# Patient Record
Sex: FEMALE | Race: WHITE | NOT HISPANIC OR LATINO | Employment: UNEMPLOYED | URBAN - METROPOLITAN AREA
[De-identification: names, ages, dates, MRNs, and addresses within clinical notes are randomized per-mention and may not be internally consistent; named-entity substitution may affect disease eponyms.]

---

## 2025-05-01 ENCOUNTER — HOSPITAL ENCOUNTER (EMERGENCY)
Dept: CARDIOLOGY | Facility: HOSPITAL | Age: 50
Discharge: HOME | End: 2025-05-01

## 2025-05-01 ENCOUNTER — HOSPITAL ENCOUNTER (EMERGENCY)
Facility: HOSPITAL | Age: 50
Discharge: HOME | End: 2025-05-02
Attending: EMERGENCY MEDICINE

## 2025-05-01 ENCOUNTER — APPOINTMENT (OUTPATIENT)
Dept: CARDIOLOGY | Facility: HOSPITAL | Age: 50
End: 2025-05-01

## 2025-05-01 DIAGNOSIS — F29 PSYCHOSIS, UNSPECIFIED PSYCHOSIS TYPE (MULTI): Primary | ICD-10-CM

## 2025-05-01 LAB
ALBUMIN SERPL BCP-MCNC: 4.2 G/DL (ref 3.4–5)
ALP SERPL-CCNC: 72 U/L (ref 33–110)
ALT SERPL W P-5'-P-CCNC: 11 U/L (ref 7–45)
AMORPH CRY #/AREA UR COMP ASSIST: NORMAL /HPF
AMPHETAMINES UR QL SCN: NORMAL
ANION GAP SERPL CALC-SCNC: 9 MMOL/L (ref 10–20)
APAP SERPL-MCNC: <10 UG/ML (ref ?–30)
APPEARANCE UR: CLEAR
AST SERPL W P-5'-P-CCNC: 15 U/L (ref 9–39)
B-HCG SERPL-ACNC: 2 MIU/ML
BARBITURATES UR QL SCN: NORMAL
BASOPHILS # BLD AUTO: 0.01 X10*3/UL (ref 0–0.1)
BASOPHILS NFR BLD AUTO: 0.3 %
BENZODIAZ UR QL SCN: NORMAL
BILIRUB SERPL-MCNC: 0.7 MG/DL (ref 0–1.2)
BILIRUB UR STRIP.AUTO-MCNC: NEGATIVE MG/DL
BUN SERPL-MCNC: 17 MG/DL (ref 6–23)
BZE UR QL SCN: NORMAL
CALCIUM SERPL-MCNC: 9.4 MG/DL (ref 8.6–10.3)
CANNABINOIDS UR QL SCN: NORMAL
CHLORIDE SERPL-SCNC: 103 MMOL/L (ref 98–107)
CK SERPL-CCNC: 108 U/L (ref 0–215)
CO2 SERPL-SCNC: 30 MMOL/L (ref 21–32)
COLOR UR: COLORLESS
CREAT SERPL-MCNC: 0.74 MG/DL (ref 0.5–1.05)
EGFRCR SERPLBLD CKD-EPI 2021: >90 ML/MIN/1.73M*2
EOSINOPHIL # BLD AUTO: 0.03 X10*3/UL (ref 0–0.7)
EOSINOPHIL NFR BLD AUTO: 0.8 %
ERYTHROCYTE [DISTWIDTH] IN BLOOD BY AUTOMATED COUNT: 12.9 % (ref 11.5–14.5)
ETHANOL SERPL-MCNC: <10 MG/DL
FENTANYL+NORFENTANYL UR QL SCN: NORMAL
GLUCOSE SERPL-MCNC: 120 MG/DL (ref 74–99)
GLUCOSE UR STRIP.AUTO-MCNC: NORMAL MG/DL
HCT VFR BLD AUTO: 34.2 % (ref 36–46)
HGB BLD-MCNC: 11.9 G/DL (ref 12–16)
IMM GRANULOCYTES # BLD AUTO: 0 X10*3/UL (ref 0–0.7)
IMM GRANULOCYTES NFR BLD AUTO: 0 % (ref 0–0.9)
KETONES UR STRIP.AUTO-MCNC: NEGATIVE MG/DL
LEUKOCYTE ESTERASE UR QL STRIP.AUTO: ABNORMAL
LYMPHOCYTES # BLD AUTO: 0.71 X10*3/UL (ref 1.2–4.8)
LYMPHOCYTES NFR BLD AUTO: 19 %
MCH RBC QN AUTO: 33.1 PG (ref 26–34)
MCHC RBC AUTO-ENTMCNC: 34.8 G/DL (ref 32–36)
MCV RBC AUTO: 95 FL (ref 80–100)
METHADONE UR QL SCN: NORMAL
MONOCYTES # BLD AUTO: 0.3 X10*3/UL (ref 0.1–1)
MONOCYTES NFR BLD AUTO: 8 %
NEUTROPHILS # BLD AUTO: 2.68 X10*3/UL (ref 1.2–7.7)
NEUTROPHILS NFR BLD AUTO: 71.9 %
NITRITE UR QL STRIP.AUTO: NEGATIVE
NRBC BLD-RTO: 0 /100 WBCS (ref 0–0)
OPIATES UR QL SCN: NORMAL
OXYCODONE+OXYMORPHONE UR QL SCN: NORMAL
PCP UR QL SCN: NORMAL
PH UR STRIP.AUTO: 7.5 [PH]
PLATELET # BLD AUTO: 173 X10*3/UL (ref 150–450)
POTASSIUM SERPL-SCNC: 3.7 MMOL/L (ref 3.5–5.3)
PROT SERPL-MCNC: 6.4 G/DL (ref 6.4–8.2)
PROT UR STRIP.AUTO-MCNC: NEGATIVE MG/DL
RBC # BLD AUTO: 3.59 X10*6/UL (ref 4–5.2)
RBC # UR STRIP.AUTO: NEGATIVE MG/DL
RBC #/AREA URNS AUTO: NORMAL /HPF
SALICYLATES SERPL-MCNC: <3 MG/DL (ref ?–20)
SODIUM SERPL-SCNC: 138 MMOL/L (ref 136–145)
SP GR UR STRIP.AUTO: 1
SQUAMOUS #/AREA URNS AUTO: NORMAL /HPF
TSH SERPL-ACNC: 2.06 MIU/L (ref 0.44–3.98)
UROBILINOGEN UR STRIP.AUTO-MCNC: NORMAL MG/DL
WBC # BLD AUTO: 3.7 X10*3/UL (ref 4.4–11.3)
WBC #/AREA URNS AUTO: NORMAL /HPF

## 2025-05-01 PROCEDURE — 80143 DRUG ASSAY ACETAMINOPHEN: CPT | Performed by: EMERGENCY MEDICINE

## 2025-05-01 PROCEDURE — 36415 COLL VENOUS BLD VENIPUNCTURE: CPT | Performed by: EMERGENCY MEDICINE

## 2025-05-01 PROCEDURE — 81001 URINALYSIS AUTO W/SCOPE: CPT | Performed by: EMERGENCY MEDICINE

## 2025-05-01 PROCEDURE — 82550 ASSAY OF CK (CPK): CPT | Performed by: EMERGENCY MEDICINE

## 2025-05-01 PROCEDURE — 85025 COMPLETE CBC W/AUTO DIFF WBC: CPT | Performed by: EMERGENCY MEDICINE

## 2025-05-01 PROCEDURE — 80307 DRUG TEST PRSMV CHEM ANLYZR: CPT | Performed by: EMERGENCY MEDICINE

## 2025-05-01 PROCEDURE — 84702 CHORIONIC GONADOTROPIN TEST: CPT | Performed by: EMERGENCY MEDICINE

## 2025-05-01 PROCEDURE — 80320 DRUG SCREEN QUANTALCOHOLS: CPT | Performed by: EMERGENCY MEDICINE

## 2025-05-01 PROCEDURE — 93005 ELECTROCARDIOGRAM TRACING: CPT

## 2025-05-01 PROCEDURE — 2500000004 HC RX 250 GENERAL PHARMACY W/ HCPCS (ALT 636 FOR OP/ED): Performed by: EMERGENCY MEDICINE

## 2025-05-01 PROCEDURE — 84443 ASSAY THYROID STIM HORMONE: CPT | Performed by: EMERGENCY MEDICINE

## 2025-05-01 PROCEDURE — 99285 EMERGENCY DEPT VISIT HI MDM: CPT | Performed by: EMERGENCY MEDICINE

## 2025-05-01 PROCEDURE — 87086 URINE CULTURE/COLONY COUNT: CPT | Mod: ELYLAB | Performed by: EMERGENCY MEDICINE

## 2025-05-01 PROCEDURE — 80053 COMPREHEN METABOLIC PANEL: CPT | Performed by: EMERGENCY MEDICINE

## 2025-05-01 PROCEDURE — 96372 THER/PROPH/DIAG INJ SC/IM: CPT | Performed by: EMERGENCY MEDICINE

## 2025-05-01 RX ORDER — OLANZAPINE 5 MG/1
5 TABLET, ORALLY DISINTEGRATING ORAL ONCE
Status: COMPLETED | OUTPATIENT
Start: 2025-05-01 | End: 2025-05-01

## 2025-05-01 RX ORDER — OLANZAPINE 5 MG/1
5 TABLET, FILM COATED ORAL ONCE
Status: COMPLETED | OUTPATIENT
Start: 2025-05-01 | End: 2025-05-01

## 2025-05-01 RX ORDER — OLANZAPINE 10 MG/2ML
5 INJECTION, POWDER, FOR SOLUTION INTRAMUSCULAR ONCE
Status: COMPLETED | OUTPATIENT
Start: 2025-05-01 | End: 2025-05-01

## 2025-05-01 RX ADMIN — OLANZAPINE 5 MG: 10 INJECTION, POWDER, FOR SOLUTION INTRAMUSCULAR at 19:40

## 2025-05-01 SDOH — HEALTH STABILITY: MENTAL HEALTH: BEHAVIORS/MOOD: GUARDED;DEFIANT;HALLUCINATIONS;HYPER-VERBAL;IRRITABLE;PARANOID;RESTLESS

## 2025-05-01 SDOH — HEALTH STABILITY: MENTAL HEALTH: HAVE YOU WISHED YOU WERE DEAD OR WISHED YOU COULD GO TO SLEEP AND NOT WAKE UP?: NO

## 2025-05-01 SDOH — HEALTH STABILITY: MENTAL HEALTH: NEEDS EXPRESSED: DENIES

## 2025-05-01 SDOH — HEALTH STABILITY: MENTAL HEALTH: BEHAVIORS/MOOD: CALM;SLEEPING

## 2025-05-01 SDOH — SOCIAL STABILITY: SOCIAL INSECURITY: FAMILY BEHAVIORS: CALM;COOPERATIVE;ANXIOUS

## 2025-05-01 SDOH — HEALTH STABILITY: MENTAL HEALTH: SUICIDE ASSESSMENT: ADULT (C-SSRS)

## 2025-05-01 SDOH — HEALTH STABILITY: MENTAL HEALTH: DELUSIONS: CONTROLLING

## 2025-05-01 SDOH — SOCIAL STABILITY: SOCIAL NETWORK: PARENT/GUARDIAN/SIGNIFICANT OTHER INVOLVEMENT: ATTENTIVE TO PATIENT NEEDS

## 2025-05-01 SDOH — HEALTH STABILITY: MENTAL HEALTH: NEEDS EXPRESSED: EMOTIONAL;PHYSICAL

## 2025-05-01 SDOH — HEALTH STABILITY: MENTAL HEALTH: BEHAVIORAL HEALTH(WDL): EXCEPTIONS TO WDL

## 2025-05-01 SDOH — HEALTH STABILITY: MENTAL HEALTH: HAVE YOU EVER DONE ANYTHING, STARTED TO DO ANYTHING, OR PREPARED TO DO ANYTHING TO END YOUR LIFE?: NO

## 2025-05-01 SDOH — SOCIAL STABILITY: SOCIAL INSECURITY: FAMILY BEHAVIORS: CALM;COOPERATIVE

## 2025-05-01 SDOH — HEALTH STABILITY: MENTAL HEALTH: CONTENT: UNABLE TO ASSESS

## 2025-05-01 SDOH — HEALTH STABILITY: MENTAL HEALTH: HAVE YOU ACTUALLY HAD ANY THOUGHTS OF KILLING YOURSELF?: NO

## 2025-05-01 SDOH — HEALTH STABILITY: MENTAL HEALTH
OTHER SUICIDE PRECAUTIONS INCLUDE: PATIENT PLACED IN GOWN (SNAPS OR PAPER GOWNS PREFERRED) AND WANDED;PATIENT PLACED IN PSYCH SAFE ROOM (IF AVAILABLE);ELOPEMENT RISK IDENTIFIED;FAMILY/VISITOR ADVISED TO MAINTAIN CONTROL OF OWN PERSONAL BELONGINGS IN ROOM

## 2025-05-01 SDOH — SOCIAL STABILITY: SOCIAL NETWORK: VISITOR BEHAVIORS: CALM;COOPERATIVE;SUPPORTIVE

## 2025-05-01 SDOH — HEALTH STABILITY: MENTAL HEALTH: HALLUCINATION: AUDITORY;VISUAL

## 2025-05-01 SDOH — HEALTH STABILITY: MENTAL HEALTH: HALLUCINATION: AUDITORY

## 2025-05-01 SDOH — HEALTH STABILITY: MENTAL HEALTH: BEHAVIORS/MOOD: GUARDED

## 2025-05-01 SDOH — HEALTH STABILITY: MENTAL HEALTH: SLEEP PATTERN: DIFFICULTY FALLING ASLEEP;DISTURBED/INTERRUPTED SLEEP;RESTLESSNESS

## 2025-05-01 SDOH — SOCIAL STABILITY: SOCIAL NETWORK: EMOTIONAL SUPPORT GIVEN: REASSURE

## 2025-05-01 SDOH — SOCIAL STABILITY: SOCIAL INSECURITY: FAMILY BEHAVIORS: CALM;COOPERATIVE;SUPPORTIVE

## 2025-05-01 SDOH — HEALTH STABILITY: MENTAL HEALTH: CONTENT: UNABLE TO ASSESS;UNREMARKABLE

## 2025-05-01 SDOH — HEALTH STABILITY: MENTAL HEALTH: DELUSIONS: CONTROLLING;PARANOID

## 2025-05-01 ASSESSMENT — PAIN - FUNCTIONAL ASSESSMENT: PAIN_FUNCTIONAL_ASSESSMENT: 0-10

## 2025-05-01 ASSESSMENT — COLUMBIA-SUICIDE SEVERITY RATING SCALE - C-SSRS
6. HAVE YOU EVER DONE ANYTHING, STARTED TO DO ANYTHING, OR PREPARED TO DO ANYTHING TO END YOUR LIFE?: NO
1. IN THE PAST MONTH, HAVE YOU WISHED YOU WERE DEAD OR WISHED YOU COULD GO TO SLEEP AND NOT WAKE UP?: NO
2. HAVE YOU ACTUALLY HAD ANY THOUGHTS OF KILLING YOURSELF?: NO

## 2025-05-01 ASSESSMENT — LIFESTYLE VARIABLES
TOTAL SCORE: 0
EVER FELT BAD OR GUILTY ABOUT YOUR DRINKING: NO
HAVE PEOPLE ANNOYED YOU BY CRITICIZING YOUR DRINKING: NO
HAVE YOU EVER FELT YOU SHOULD CUT DOWN ON YOUR DRINKING: NO
EVER HAD A DRINK FIRST THING IN THE MORNING TO STEADY YOUR NERVES TO GET RID OF A HANGOVER: NO

## 2025-05-01 ASSESSMENT — PAIN SCALES - GENERAL: PAINLEVEL_OUTOF10: 0 - NO PAIN

## 2025-05-01 NOTE — ED PROVIDER NOTES
"50-year-old female presents emergency department accompanied by daughter via EMS after being pink slipped by [facility].  Patient reportedly has schizophrenia as well as bipolar disorder.  She has not been taking any mental health medications.  Per daughter at bedside patient also has not been eating or drinking, bathing, or sleeping.  They state that yesterday she had a \"episode \"where she became very angry and was yelling and throwing things.  They also state that she is having hallucinations.  Patient denies all of this and states \"I am fine \".  She does confirm that she is not following with anyone for her mental health and does not take any mental health medications.  She denies thoughts of wanting to harm others or herself.  Denies hallucinations.  Patient reports physically she is feeling well no fevers, coughing, or congestion.  No chest pain or difficulty breathing.  Denies abdominal pain, nausea, vomiting, dysuria, diarrhea, constipation, black or bloody stools.  No reported tobacco use, illicit drug use, or regular alcohol use.      History provided by:  Patient, medical records, relative and EMS personnel ([facility] pink sli[)  History limited by:  Psychiatric disorder   used: No           ------------------------------------------------------------------------------------------------------------------------------------------    VS: As documented in the triage note and EMR flowsheet from this visit were reviewed.    Review of Systems  Review of systems limited due to psychiatric disorder    [facility]  Nursing notes reviewed and confirmed by me.  Chart review performed including medications, allergies, and medical, surgical, and family history  Visit Vitals  /67 (BP Location: Right arm, Patient Position: Sitting)   Pulse 94   Temp 36.8 °C (98.2 °F)   Resp 16     Physical Exam  Vitals and nursing note reviewed.   Constitutional:       General: She is not in acute distress.     Appearance: " She is not ill-appearing.      Comments: Patient is somewhat disheveled in appearance.   HENT:      Head: Normocephalic and atraumatic.      Right Ear: External ear normal.      Left Ear: External ear normal.      Nose: Nose normal. No congestion or rhinorrhea.      Mouth/Throat:      Mouth: Mucous membranes are dry.      Pharynx: No oropharyngeal exudate or posterior oropharyngeal erythema.   Eyes:      Extraocular Movements: Extraocular movements intact.      Conjunctiva/sclera: Conjunctivae normal.      Pupils: Pupils are equal, round, and reactive to light.   Cardiovascular:      Rate and Rhythm: Normal rate and regular rhythm.      Pulses: Normal pulses.      Heart sounds: Normal heart sounds.   Pulmonary:      Effort: Pulmonary effort is normal. No respiratory distress.      Breath sounds: Normal breath sounds. No stridor. No wheezing, rhonchi or rales.   Abdominal:      General: There is no distension.      Palpations: Abdomen is soft.      Tenderness: There is no abdominal tenderness. There is no guarding or rebound.   Musculoskeletal:         General: No swelling or deformity. Normal range of motion.      Cervical back: Normal range of motion and neck supple. No rigidity.      Right lower leg: Edema present.      Left lower leg: Edema present.      Comments: No calf tenderness   Lower extremity edema bilateral   Skin:     General: Skin is warm and dry.      Capillary Refill: Capillary refill takes less than 2 seconds.      Coloration: Skin is not jaundiced.      Findings: Erythema present. No rash.      Comments: Bilateral lower extremities are erythematous from the feet to below the knee.  No obvious wounds.  No fluctuance.  No tenderness to palpation or crepitance.   Neurological:      General: No focal deficit present.      Mental Status: She is alert and oriented to person, place, and time.      Sensory: No sensory deficit.      Motor: No weakness.   Psychiatric:         Attention and Perception: She is  inattentive (Patient seems internally stimulated on my exam).         Mood and Affect: Mood normal. Affect is flat.         Speech: Speech is delayed.         Behavior: Behavior is withdrawn.         Thought Content: Thought content does not include homicidal or suicidal ideation. Thought content does not include homicidal or suicidal plan.      Comments: Family reports patient has been hallucinating and agitated.        Medical History[1]   Surgical History[2]   Social History[3]   ------------------------------------------------------------------------------------------------------------------------------------------  No orders to display      Labs Reviewed   CBC WITH AUTO DIFFERENTIAL - Abnormal       Result Value    WBC 3.7 (*)     nRBC 0.0      RBC 3.59 (*)     Hemoglobin 11.9 (*)     Hematocrit 34.2 (*)     MCV 95      MCH 33.1      MCHC 34.8      RDW 12.9      Platelets 173      Neutrophils % 71.9      Immature Granulocytes %, Automated 0.0      Lymphocytes % 19.0      Monocytes % 8.0      Eosinophils % 0.8      Basophils % 0.3      Neutrophils Absolute 2.68      Immature Granulocytes Absolute, Automated 0.00      Lymphocytes Absolute 0.71 (*)     Monocytes Absolute 0.30      Eosinophils Absolute 0.03      Basophils Absolute 0.01     COMPREHENSIVE METABOLIC PANEL - Abnormal    Glucose 120 (*)     Sodium 138      Potassium 3.7      Chloride 103      Bicarbonate 30      Anion Gap 9 (*)     Urea Nitrogen 17      Creatinine 0.74      eGFR >90      Calcium 9.4      Albumin 4.2      Alkaline Phosphatase 72      Total Protein 6.4      AST 15      Bilirubin, Total 0.7      ALT 11     URINALYSIS WITH REFLEX CULTURE AND MICROSCOPIC - Abnormal    Color, Urine Colorless (*)     Appearance, Urine Clear      Specific Gravity, Urine 1.005      pH, Urine 7.5      Protein, Urine NEGATIVE      Glucose, Urine Normal      Blood, Urine NEGATIVE      Ketones, Urine NEGATIVE      Bilirubin, Urine NEGATIVE      Urobilinogen, Urine  Normal      Nitrite, Urine NEGATIVE      Leukocyte Esterase, Urine 75 Glo/uL (*)    DRUG SCREEN,URINE - Normal    Amphetamine Screen, Urine Presumptive Negative      Barbiturate Screen, Urine Presumptive Negative      Benzodiazepines Screen, Urine Presumptive Negative      Cannabinoid Screen, Urine Presumptive Negative      Cocaine Metabolite Screen, Urine Presumptive Negative      Fentanyl Screen, Urine Presumptive Negative      Opiate Screen, Urine Presumptive Negative      Oxycodone Screen, Urine Presumptive Negative      PCP Screen, Urine Presumptive Negative      Methadone Screen, Urine Presumptive Negative      Narrative:     Drug screen results are presumptive and should not be used to assess   compliance with prescribed medication. Contact the performing [facility] laboratory   to add-on definitive confirmatory testing if clinically indicated.    Toxicology screening results are reported qualitatively. The concentration must   be greater than or equal to the cutoff to be reported as positive. The concentration   at which the screening test can detect an individual drug or metabolite varies.   The absence of expected drug(s) and/or drug metabolite(s) may indicate non-compliance,   inappropriate timing of specimen collection relative to drug administration, poor drug   absorption, diluted/adulterated urine, or limitations of testing. For medical purposes   only; not valid for forensic use.    Interpretive questions should be directed to the laboratory medical directors.   ACUTE TOXICOLOGY PANEL, BLOOD - Normal    Acetaminophen <10.0      Salicylate  <3      Alcohol <10     HUMAN CHORIONIC GONADOTROPIN, SERUM QUANTITATIVE - Normal    HCG, Beta-Quantitative 2      Narrative:      Total HCG measurement is performed using the [employer] Access   Immunoassay which detects intact HCG and free beta HCG subunit.    This test is not indicated for use as a tumor marker.   HCG testing is performed using a different test  methodology at [facility] than other [facility]. Direct result comparison   should only be made within the same method.       CREATINE KINASE - Normal    Creatine Kinase 108     TSH WITH REFLEX TO FREE T4 IF ABNORMAL - Normal    Thyroid Stimulating Hormone 2.06      Narrative:     TSH testing is performed using different testing methodology at [facility] than at other [facility]. Direct result comparisons should only be made within the same method.     URINE CULTURE   MICROSCOPIC ONLY, URINE    WBC, Urine 1-5      RBC, Urine NONE      Squamous Epithelial Cells, Urine 1-9 (SPARSE)      Amorphous Crystals, Urine 1+     URINALYSIS WITH REFLEX CULTURE AND MICROSCOPIC    Narrative:     The following orders were created for panel order Urinalysis with Reflex Culture and Microscopic.  Procedure                               Abnormality         Status                     ---------                               -----------         ------                     Urinalysis with Reflex C...[[id number]]  Abnormal            Final result               Extra Urine Gray Tube[[id number]]                            In process                   Please view results for these tests on the individual orders.   EXTRA URINE GRAY TUBE        Medical Decision Making  EKG interpreted by ED physician: Normal sinus rhythm with sinus arrhythmia rate of 79.  UT, QRS, QTc intervals all within normal limits.  No significant ST elevations or depressions.  No significant Q waves.  Good R wave progression.  Normal axis.      50-year-old female presents emergency department with chief complaint of needing psychiatric evaluation.  Patient reportedly has not been taking her medications, eating, drinking, bathing, or sleeping well.  She is also having hallucinations and had episode of agitation yesterday.  On my exam patient is withdrawn and does seem internally stimulated.  She denies any complaints.  Given presenting  complaints and pink slip psychiatric evaluation is obtained.  UA does not show obvious finding of infection.  Drug screen is negative.  Toxicology panel is negative.  Thyroid studies within normal range.  Pregnancy testing is negative.  CBC shows leukopenia and mild anemia.  I do not suspect sepsis.  CMP shows no significant metabolic abnormality.  Patient is medically cleared.  Patient was evaluated by [facility] and they do recommend psychiatric placement.  While awaiting placement patient did become agitated here in the emergency department and started becoming aggressive and yelling.  Patient was given 5 Zyprexa for symptomatic treatment.  Nursing staff did notify me that they made a mistake and gave patient 10 of IM Zyprexa.  I recommended they place patient on monitor as this dose may further sedate the patient.  Patient was reevaluated after IM Zyprexa.  She is resting comfortably in bed with adequate oxygen saturation on room air in no acute distress.  At the end of my shift patient is pending [facility] placement and signed out to Dr. Najera.       Diagnoses as of 05/01/25 2153   Psychosis, unspecified psychosis type (Multi)      1. Psychosis, unspecified psychosis type (Multi)           Procedures     This note was dictated using dragon software and may contain errors related to dictation interpretation errors.        Nahum Nuno DO  05/01/25 2147         [1]   Past Medical History:  Diagnosis Date    PTSD (post-traumatic stress disorder)     Schizophrenia    [2] History reviewed. No pertinent surgical history.  [3]   Social History  Socioeconomic History    Marital status: Single   Tobacco Use    Smoking status: Never    Smokeless tobacco: Never   Vaping Use    Vaping status: Never Used   Substance and Sexual Activity    Alcohol use: Never    Drug use: Never        Nahum Nuno DO  05/01/25 2153

## 2025-05-01 NOTE — CONSULTS
BEHAVIORAL HEALTH INITIAL CONSULTATION NOTE    Referring Provider: Gagan Nuno MD     Consultation information:  Consults   Visit type: Virtual evaluation    HISTORY OF PRESENT ILLNESS:  Samantha Hagan is a 50 y.o. female with a history of schizophrenia and PTSD who presented to [facility] ED via pink slip from [facility] for a psychiatric evaluation. In the ED, patient denied all psychiatric symptoms, but appeared internally stimulated. SAFE-T screening was negative. [facility] was consulted for further evaluation.     During the interview, Ms. Hagan had a difficult time communicating. She appeared internally stimulated and mumbled to herself throughout the discussion. She was easily distractible and required frequent re-direction. She was oriented to person and place, however not to the context. Ms. Hagan concretely stated that she was not having suicidal or homicidal thoughts. She also reported no auditory or visual hallucinations.     Collateral: Ruthie Samson, patient's mother, present at bedside  Ms. Samson reported that the family has been concerned about Ms. Hagan for months due to her behaviors at home. Over the last six months, Ms. Hagan has lost 60 to 70 lbs. The house is disorganized without the help of family to clean it. She is not sleeping during the night and is often viewed talking to herself. Family has noticed that she will go into a closet and talk to unseen others. Ms. Hagan has not expressed suicidal thoughts, however they are concerned that she is depressed. Today, a visit from [facility] was set-up by the family[facility] and [facility] concerned about her well being, which prompted the hospital visit. The below history was provided by Ms. Samson. Family reported no concern for alcohol or illicit substance use.     Past Psychiatric History  Current/Previous Diagnoses: Schizophrenia, PTSD  Current Psychiatrist/Provider: [facility] - completed initial eval   Past Medication Trials: Unclear   Inpatient  "Hospitalizations: 1 remote admission to [facility]   Suicide Attempts: Denies    Substance Abuse History  Tobacco use history: Denies  Alcohol use history: Denies  Cannabis use history: Denies  Illicit Drug Use History: Denies    Social History  Household: lives alone  Occupation: unemployed  She has 2 daughters and 2 sons   Legal hx: Denies  Weapons at home and access to lethal means: Denies    OARRS REVIEW  OARRS checked: Score 0, no rx     ALLERGIES  Morphine and Oxycodone-acetaminophen    Surgical History  She has no past surgical history on file.    FAMILY HISTORY  Family History[1]     PSYCHIATRIC REVIEW OF SYSTEMS  Depression: Low mood   Anxiety: negative  Lidia: negative  Psychosis: auditory hallucinations:unspecified type, thought withdrawal, disorganized behavior, and negative symptoms: flat affect, alogia, and poor attention    OBJECTIVE    VITALS      5/1/2025     5:06 PM   Vitals   Systolic 113   Diastolic 73   BP Location Left arm   Heart Rate 95   Temp 36.8 °C (98.2 °F)   Resp 18   Height 1.549 m (5' 1\")   Weight (lb) 75   BMI 14.17 kg/m2   BSA (m2) 1.21 m2      Body mass index is 14.17 kg/m².  Facility age limit for growth %brandan is 20 years.  Wt Readings from Last 4 Encounters:   05/01/25 (!) 34 kg (75 lb)       Mental Status Exam  General: NAD, seated comfortably during interview.  Appearance: Appeared older than stated age, disheveled   Attitude: Calm, but guarded   Behavior: Fair EC; slow to respond   Motor Activity: No notable alvarado PMAR  Speech: Low tone and volume. Increased speech latency.   Mood: Appears confused   Affect: Dysthymic; constricted range/intensity; appropriate and congruent  Thought Process: [place] and at times sparse  Thought Content: Denied SI/HI. Not voicing/endorsing delusions.  Thought Perception: She denied hallucinations, however, she appeared internally stimulated evidenced by her talking to herself   Cognition: Alert and oriented x2  Insight: Poor  Judgement: " Poor    LABS  Recent Results (from the past 6 weeks)   CBC and Auto Differential    Collection Time: 05/01/25  6:02 PM   Result Value Ref Range    WBC 3.7 (L) 4.4 - 11.3 x10*3/uL    nRBC 0.0 0.0 - 0.0 /100 WBCs    RBC 3.59 (L) 4.00 - 5.20 x10*6/uL    Hemoglobin 11.9 (L) 12.0 - 16.0 g/dL    Hematocrit 34.2 (L) 36.0 - 46.0 %    MCV 95 80 - 100 fL    MCH 33.1 26.0 - 34.0 pg    MCHC 34.8 32.0 - 36.0 g/dL    RDW 12.9 11.5 - 14.5 %    Platelets 173 150 - 450 x10*3/uL    Neutrophils % 71.9 40.0 - 80.0 %    Immature Granulocytes %, Automated 0.0 0.0 - 0.9 %    Lymphocytes % 19.0 13.0 - 44.0 %    Monocytes % 8.0 2.0 - 10.0 %    Eosinophils % 0.8 0.0 - 6.0 %    Basophils % 0.3 0.0 - 2.0 %    Neutrophils Absolute 2.68 1.20 - 7.70 x10*3/uL    Immature Granulocytes Absolute, Automated 0.00 0.00 - 0.70 x10*3/uL    Lymphocytes Absolute 0.71 (L) 1.20 - 4.80 x10*3/uL    Monocytes Absolute 0.30 0.10 - 1.00 x10*3/uL    Eosinophils Absolute 0.03 0.00 - 0.70 x10*3/uL    Basophils Absolute 0.01 0.00 - 0.10 x10*3/uL   Comprehensive Metabolic Panel    Collection Time: 05/01/25  6:02 PM   Result Value Ref Range    Glucose 120 (H) 74 - 99 mg/dL    Sodium 138 136 - 145 mmol/L    Potassium 3.7 3.5 - 5.3 mmol/L    Chloride 103 98 - 107 mmol/L    Bicarbonate 30 21 - 32 mmol/L    Anion Gap 9 (L) 10 - 20 mmol/L    Urea Nitrogen 17 6 - 23 mg/dL    Creatinine 0.74 0.50 - 1.05 mg/dL    eGFR >90 >60 mL/min/1.73m*2    Calcium 9.4 8.6 - 10.3 mg/dL    Albumin 4.2 3.4 - 5.0 g/dL    Alkaline Phosphatase 72 33 - 110 U/L    Total Protein 6.4 6.4 - 8.2 g/dL    AST 15 9 - 39 U/L    Bilirubin, Total 0.7 0.0 - 1.2 mg/dL    ALT 11 7 - 45 U/L   Acute Toxicology Panel, Blood    Collection Time: 05/01/25  6:02 PM   Result Value Ref Range    Acetaminophen <10.0 10.0 - 30.0 ug/mL    Salicylate  <3 4 - 20 mg/dL    Alcohol <10 <=10 mg/dL   hCG, quantitative, pregnancy    Collection Time: 05/01/25  6:02 PM   Result Value Ref Range    HCG, Beta-Quantitative 2 <5 mIU/mL    Creatine Kinase    Collection Time: 05/01/25  6:02 PM   Result Value Ref Range    Creatine Kinase 108 0 - 215 U/L     PSYCHIATRIC RISK ASSESSMENT  Violence Risk Factors:  current psychiatric illness  Acute Risk of Harm to Others is Considered: Low  Suicide Risk Factors: ; /[place], current psychiatric illness, and nonadherence to medication treatment for psychosis   Protective Factors: social support/connectedness and positive family relationships  Acute Risk of Harm to Self is Considered: Low    Assessment/Plan   Samantha Hagan is a 50 y.o. female with a history of schizophrenia and PTSD who presented to [facility] ED via pink slip from [facility] for a psychiatric evaluation. In the ED, patient denied all psychiatric symptoms, but appeared internally stimulated and exhibited thought blocking. Per family, the patient has lost a substantial amount of weight, is minimally sleeping at night, and talks to unseen others in the closet. Ms. Hagan exhibits a substantial disorder of thought, mood, and perception that grossly impairs her behavior, judgement, and capacity to recognize reality. As a result, she is unable to provide for her needs and psychiatric hospitalization is the least restrictive setting.     IMPRESSION:  Schizophrenia    RECOMMENDATIONS:  - Patient meets criteria for inpatient psychiatric hospitalization  - Please complete pink slip once placement is found   - Patient lacks capacity to leave AMA. Agree with elopement precautions   - Defer medication adjustments to admit team     Recs relayed to ER provider   Patient to be discussed with attending psychiatrist, Dr. Morris        [1] No family history on file.

## 2025-05-02 VITALS
DIASTOLIC BLOOD PRESSURE: 67 MMHG | WEIGHT: 102.07 LBS | HEART RATE: 78 BPM | BODY MASS INDEX: 19.27 KG/M2 | RESPIRATION RATE: 18 BRPM | OXYGEN SATURATION: 98 % | HEIGHT: 61 IN | TEMPERATURE: 98.2 F | SYSTOLIC BLOOD PRESSURE: 101 MMHG

## 2025-05-02 LAB
ATRIAL RATE: 79 BPM
HOLD SPECIMEN: 293
HOLD SPECIMEN: NORMAL
P AXIS: 48 DEGREES
P OFFSET: 197 MS
P ONSET: 163 MS
PR INTERVAL: 120 MS
Q ONSET: 223 MS
QRS COUNT: 13 BEATS
QRS DURATION: 66 MS
QT INTERVAL: 382 MS
QTC CALCULATION(BAZETT): 438 MS
QTC FREDERICIA: 418 MS
R AXIS: 53 DEGREES
T AXIS: 52 DEGREES
T OFFSET: 414 MS
VENTRICULAR RATE: 79 BPM

## 2025-05-02 SDOH — HEALTH STABILITY: MENTAL HEALTH: BEHAVIORAL HEALTH(WDL): EXCEPTIONS TO WDL

## 2025-05-02 SDOH — HEALTH STABILITY: MENTAL HEALTH: BEHAVIORS/MOOD: CALM;SLEEPING

## 2025-05-02 SDOH — HEALTH STABILITY: MENTAL HEALTH: BEHAVIORS/MOOD: CALM;COOPERATIVE

## 2025-05-02 SDOH — HEALTH STABILITY: MENTAL HEALTH
OTHER SUICIDE PRECAUTIONS INCLUDE: PATIENT PLACED IN AN EASILY OBSERVABLE ROOM WITH DOOR/CURTAIN REMAINING OPEN;PATIENT PLACED IN GOWN (SNAPS OR PAPER GOWNS PREFERRED) AND WANDED;REMAINING RISKS IDENTIFIED AND MITIGATED;PATIENT PLACED IN PSYCH SAFE ROOM (IF AVAILABLE);PROVIDER NOTIFIED;ELOPEMENT RISK IDENTIFIED;FAMILY/VISITOR ADVISED TO MAINTAIN CONTROL OF OWN PERSONAL BELONGINGS IN ROOM;FREQUENT ROUNDING WITH IRREGULAR CHECKS AT MINIMUM OF EVERY 15 MINUTES TO ASSESS PSYCH SAFETY PERFORMED;HOME MEDICATION LIST COLLECTED AND SHARED WITH PROVIDER;HOURLY BEHAVIORAL ASSESSMENT PERFORMED;PERSONAL BELONGINGS SECURED;TREATMENT PLAN BASED ON RISK FACTORS DEVELOPED (ED ONLY - IF PATIENT IN ED MORE THAN 8 HOURS);VISITORS LIMITED WHEN NECESSARY AND PERSONAL ITEMS SCREENED

## 2025-05-02 SDOH — HEALTH STABILITY: MENTAL HEALTH: FOR HIGH RISK PATIENTS: ALL INTERVENTIONS ABOVE, PLUS:;1:1 PATIENT OBSERVER AT ALL TIMES

## 2025-05-02 NOTE — SIGNIFICANT EVENT
Application for Emergency Admission      Ready for Transfer?  Is the patient medically cleared for transfer to inpatient psychiatry: Yes  Has the patient been accepted to an inpatient psychiatric hospital: Yes    Application for Emergency Admission  IN ACCORDANCE WITH [place]  The Chief Clinical Officer of: [place] 5/2/2025 .8:58 AM    Reason for Hospitalization  The undersigned has reason to believe that: Samantha Hagan Is a mentally ill person subject to hospitalization by court order under division B Section 5122.01 of [place] Revised Code, i.e., this person:    1.No  Represents a substantial risk of physical harm to self as manifested by evidence of threats of, or attempts at, suicide or serious self-inflicted bodily harm    2.Yes Represents a substantial risk of physical harm to others as manifested by evidence of recent homicidal or other violent behavior, evidence of recent threats that place another in reasonable fear of violent behavior and serious physical harm, or other evidence of present dangerousness    3.Yes Represents a substantial and immediate risk of serious physical impairment or injury to self as manifested by  evidence that the person is unable to provide for and is not providing for the person's basic physical needs because of the person's mental illness and that appropriate provision for those needs cannot be made  immediately available in the community    4.Yes Would benefit from treatment in a hospital for his mental illness and is in need of such treatment as manifested by evidence of behavior that creates a grave and imminent risk to substantial rights of others or  himself.    5.Yes Would benefit from treatment as manifested by evidence of behavior that indicates all of the following:       (a) The person is unlikely to survive safely in the community without supervision, based on a clinical determination.       (b) The person has a history of lack of compliance with  treatment for mental illness and one of the following applies:      (i) At least twice within the thirty-six months prior to the filing of an affidavit seeking court-ordered treatment of the person under section 5122.111 of the Revised Code, the lack of compliance has been a significant factor in necessitating hospitalization in a hospital or receipt of services in a forensic or other mental health unit of a correctional facility, provided that the thirty-six-month period shall be extended by the length of any hospitalization or incarceration of the person that occurred within the thirty-six-month period.      (ii) Within the forty-eight months prior to the filing of an affidavit seeking court-ordered treatment of the person under section 5122.111 of the Revised Code, the lack of compliance resulted in one or more acts of serious violent behavior toward self or others or threats of, or attempts at, serious physical harm to self or others, provided that the forty-eight-month period shall be extended by the length of any hospitalization or incarceration of the person that occurred within the forty-eight-month period.      (c) The person, as a result of mental illness, is unlikely to voluntarily participate in necessary treatment.       (d) In view of the person's treatment history and current behavior, the person is in need of treatment in order to prevent a relapse or deterioration that would be likely to result in substantial risk of serious harm to the person or others.    (e) Represents a substantial risk of physical harm to self or others if allowed to remain at liberty pending examination.    Therefore, it is requested that said person be admitted to the above named facility.    STATEMENT OF BELIEF    Must be filled out by one of the following: a psychiatrist, licensed physician, licensed clinical psychologist, health or ,  or .  (Statement shall include the circumstances under  which the individual was taken into custody and the reason for the person's belief that hospitalization is necessary. The statement shall also include a reference to efforts made to secure the individual's property at his residence if he was taken into custody there. Every reasonable and appropriate effort should be made to take this person into custody in the least conspicuous manner possible.)    Pt is spychotic     Marguerite Carnes MD 5/2/2025     _____________________________________________________________   Place of Employment: [employer]    STATEMENT OF OBSERVATION BY PSYCHIATRIST, LICENSED PHYSICIAN, OR LICENSED CLINICAL PSYCHOLOGIST, IF APPLICABLE    Place of Observation (e.g., [facility], general hospital, office, emergency facility)  (If applicable, please complete)    Marguerite Carnes MD 5/2/2025    _____________________________________________________________

## 2025-05-02 NOTE — ED PROVIDER NOTES
"Emergency Medicine Transition of Care Note.    I received Samantha Hagan in signout from Dr. Nuno.  Please see the previous ED provider note for all HPI, PE and MDM up to the time of signout. This is in addition to the primary record.    In brief Samantha Hagan is an 50 y.o. female presenting for   Chief Complaint   Patient presents with    Hallucinations     \"Patient was brought in from [facility] for auditory hallucinations not sure about visual.  Patient was assessed yesterday by Maricel mckeon then again today.  Patient has a hsitory of schizophrenia.\"     At the time of signout we were awaiting: Placement    Diagnoses as of 05/02/25 0515   Psychosis, unspecified psychosis type (Multi)       Medical Decision Making  At the time send the patient has been evaluated by psychiatric services and they recommend inpatient psychiatric placement.  We are awaiting placement.    [facility]        Final diagnoses:   [F29] Psychosis, unspecified psychosis type (Multi)           Procedure  Procedures    DO Jeff Fallon DO  05/02/25 0636    "

## 2025-05-03 LAB — BACTERIA UR CULT: NORMAL

## 2025-05-19 LAB
ALBUMIN SERPL-MCNC: 4.4 G/DL (ref 3.5–5.2)
ALP SERPL-CCNC: 105 U/L (ref 35–104)
ALT SERPL-CCNC: 15 U/L (ref 0–35)
ANION GAP SERPL CALCULATED.3IONS-SCNC: 12 MMOL/L (ref 7–16)
AST SERPL-CCNC: 24 U/L (ref 0–35)
BILIRUB SERPL-MCNC: 0.3 MG/DL (ref 0–1.2)
BUN SERPL-MCNC: 22 MG/DL (ref 6–20)
CALCIUM SERPL-MCNC: 9.7 MG/DL (ref 8.6–10)
CHLORIDE SERPL-SCNC: 101 MMOL/L (ref 98–107)
CO2 SERPL-SCNC: 27 MMOL/L (ref 22–29)
CREAT SERPL-MCNC: 0.6 MG/DL (ref 0.5–1)
DATE LAST DOSE: ABNORMAL
GFR, ESTIMATED: >90 ML/MIN/1.73M2
GLUCOSE SERPL-MCNC: 93 MG/DL (ref 74–99)
POTASSIUM SERPL-SCNC: 4 MMOL/L (ref 3.5–5.1)
PROT SERPL-MCNC: 7 G/DL (ref 6.4–8.3)
SODIUM SERPL-SCNC: 140 MMOL/L (ref 136–145)
TME LAST DOSE: ABNORMAL H
VALPROATE SERPL-MCNC: <3 UG/ML (ref 50–100)
VANCOMYCIN DOSE: ABNORMAL MG

## 2025-07-28 ENCOUNTER — APPOINTMENT (OUTPATIENT)
Dept: PHYSICAL THERAPY | Facility: CLINIC | Age: 50
End: 2025-07-28